# Patient Record
Sex: FEMALE
[De-identification: names, ages, dates, MRNs, and addresses within clinical notes are randomized per-mention and may not be internally consistent; named-entity substitution may affect disease eponyms.]

---

## 2023-12-18 ENCOUNTER — APPOINTMENT (OUTPATIENT)
Dept: ENDOCRINOLOGY | Facility: CLINIC | Age: 53
End: 2023-12-18
Payer: MEDICAID

## 2023-12-18 VITALS
DIASTOLIC BLOOD PRESSURE: 70 MMHG | SYSTOLIC BLOOD PRESSURE: 110 MMHG | HEIGHT: 64 IN | BODY MASS INDEX: 32.61 KG/M2 | WEIGHT: 191 LBS

## 2023-12-18 PROBLEM — Z00.00 ENCOUNTER FOR PREVENTIVE HEALTH EXAMINATION: Status: ACTIVE | Noted: 2023-12-18

## 2023-12-18 PROCEDURE — 99205 OFFICE O/P NEW HI 60 MIN: CPT

## 2023-12-18 NOTE — ASSESSMENT
[Long Term Vascular Complications] : long term vascular complications of diabetes [Importance of Diet and Exercise] : importance of diet and exercise to improve glycemic control, achieve weight loss and improve cardiovascular health [Weight Loss] : weight loss [Levothyroxine] : The patient was instructed to take Levothyroxine on an empty stomach, separate from vitamins, and wait at least 30 minutes before eating [FreeTextEntry1] : 1) Hypothyroidism: Appears clinically euthyroid at this time on 100 mcg of LT4 (taking med appropriately). Reassess TFTs and need for medication titration at this time. Reviewed importance of compliance and proper intake  2) Weight gain:  complicated by DM2. Discussed medical  strategies. Pt would like to try lifestyle modifications and at this time. Reassess on the NV for at least ~5% TBW loss.  3) Essential HTN: Pt is at goal BP and on an ACE inh. Reassess microalbumin prior to the NV.   4) Dyslipidemia: Pt is on a moderate intensity statin. REassess lipids on the next visit. LDL target <100.  5) R thyroid nodule oobtain collateral info, repeat US in 9/2024

## 2023-12-18 NOTE — HISTORY OF PRESENT ILLNESS
[FreeTextEntry1] : 52 y/o F w/ Hx of HTN, HLD, hypothyroidism here for initial evaluation - management of thyroid and metabolic issues generally feels well and endorses no acute complaints. reports diagnosis of hypothyroidism 2 y/a in the [place], initially started on LT4 50 mcg, recently increased to 100 mcg ~ 6 months ago. reports compliance and adequate intake. notes cc of unintentional weight gain and hair loss, which have been chronic issues in the past. reports n ocaloric carb counting and sedentarism. US in 9/2023 showed R thyroid nodule s/p benign biopsy per pt's self report. She otherwise denies any f/c, CP, SOB, palpitations, tremors, depressed mood, anxiety, palpitations, n/v, stool/urinary abn, skin/weight changes, heat/cold intolerance, HAs, breast/nipple changes, polyuria/polydipsia/nocturia or other complaints. she again denies any dysphagia, hoarseness, neck tenderness or new palpable masses. she again denies any family history of thyroid disorders or personal exposure to ionizing radiation.

## 2024-04-08 ENCOUNTER — APPOINTMENT (OUTPATIENT)
Dept: ENDOCRINOLOGY | Facility: CLINIC | Age: 54
End: 2024-04-08
Payer: COMMERCIAL

## 2024-04-08 DIAGNOSIS — E66.9 OBESITY, UNSPECIFIED: ICD-10-CM

## 2024-04-08 DIAGNOSIS — E04.1 NONTOXIC SINGLE THYROID NODULE: ICD-10-CM

## 2024-04-08 DIAGNOSIS — E78.5 HYPERLIPIDEMIA, UNSPECIFIED: ICD-10-CM

## 2024-04-08 DIAGNOSIS — E03.9 HYPOTHYROIDISM, UNSPECIFIED: ICD-10-CM

## 2024-04-08 DIAGNOSIS — I10 ESSENTIAL (PRIMARY) HYPERTENSION: ICD-10-CM

## 2024-04-08 PROCEDURE — 99214 OFFICE O/P EST MOD 30 MIN: CPT

## 2024-04-08 RX ORDER — LEVOTHYROXINE SODIUM 0.09 MG/1
88 TABLET ORAL DAILY
Qty: 90 | Refills: 1 | Status: ACTIVE | COMMUNITY
Start: 2023-12-18 | End: 1900-01-01

## 2024-04-08 NOTE — ASSESSMENT
[Long Term Vascular Complications] : long term vascular complications of diabetes [Importance of Diet and Exercise] : importance of diet and exercise to improve glycemic control, achieve weight loss and improve cardiovascular health [Weight Loss] : weight loss [Levothyroxine] : The patient was instructed to take Levothyroxine on an empty stomach, separate from vitamins, and wait at least 30 minutes before eating [FreeTextEntry1] : 1) Hypothyroidism: Appears clinically hyperthyroid at this time on 100 mcg of LT4 (taking med appropriately). Reassess TFTs and need for medication titration at this time. Reviewed importance of compliance and proper intake, reduce to 88 mcg  2) Weight gain:  complicated by DM2. Discussed medical  strategies. Pt would like to try lifestyle modifications and at this time. Reassess on the NV for at least ~5% TBW loss.  3) Essential HTN: Pt is at goal BP and on an ACE inh. Reassess microalbumin prior to the NV.   4) Dyslipidemia: Pt is on a moderate intensity statin. REassess lipids on the next visit. LDL target <100.  5) R thyroid nodule oobtain collateral info, repeat US in 9/2024

## 2024-04-08 NOTE — HISTORY OF PRESENT ILLNESS
[FreeTextEntry1] : 52 y/o F w/ Hx of HTN, HLD, hypothyroidism  initial evaluation - management of thyroid and metabolic issues generally feels well and endorses no acute complaints. reports diagnosis of hypothyroidism 2 y/a in the [place], initially started on LT4 50 mcg, recently increased to 100 mcg ~ 6 months ago. reports compliance and adequate intake. notes cc of unintentional weight gain and hair loss, which have been chronic issues in the past. reports n ocaloric carb counting and sedentarism. US in 9/2023 showed R thyroid nodule s/p benign biopsy per pt's self report.   4/2024 Here for /fu, generally feels well and endorses no acute complaints. No interval events since LV. Today comes for thyroid lab f/u, reports hot flashes, compliant w/ LT4 100 mcg, TSH at 0.07, fT4 at 1.6 She otherwise denies any f/c, CP, SOB, palpitations, tremors, depressed mood, anxiety, palpitations, n/v, stool/urinary abn, skin/weight changes, heat/cold intolerance, HAs, breast/nipple changes, polyuria/polydipsia/nocturia or other complaints. she again denies any dysphagia, hoarseness, neck tenderness or new palpable masses. she again denies any family history of thyroid disorders or personal exposure to ionizing radiation.